# Patient Record
Sex: MALE | Race: WHITE | NOT HISPANIC OR LATINO | Employment: STUDENT | URBAN - METROPOLITAN AREA
[De-identification: names, ages, dates, MRNs, and addresses within clinical notes are randomized per-mention and may not be internally consistent; named-entity substitution may affect disease eponyms.]

---
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
## 2023-03-05 DIAGNOSIS — K58.0 IRRITABLE BOWEL SYNDROME WITH DIARRHEA: ICD-10-CM

## 2023-03-05 DIAGNOSIS — R11.15 NON-INTRACTABLE CYCLICAL VOMITING WITHOUT NAUSEA: ICD-10-CM

## 2023-03-06 RX ORDER — OMEPRAZOLE 20 MG/1
CAPSULE, DELAYED RELEASE ORAL
Qty: 90 CAPSULE | Refills: 3 | Status: SHIPPED | OUTPATIENT
Start: 2023-03-06

## 2023-03-06 RX ORDER — DICYCLOMINE HYDROCHLORIDE 10 MG/1
CAPSULE ORAL
Qty: 90 CAPSULE | Refills: 3 | Status: SHIPPED | OUTPATIENT
Start: 2023-03-06

## 2023-12-19 ENCOUNTER — TELEPHONE (OUTPATIENT)
Dept: GASTROENTEROLOGY | Facility: CLINIC | Age: 18
End: 2023-12-19

## 2024-12-11 ENCOUNTER — TELEPHONE (OUTPATIENT)
Dept: GASTROENTEROLOGY | Facility: CLINIC | Age: 19
End: 2024-12-11

## 2024-12-11 DIAGNOSIS — K58.0 IRRITABLE BOWEL SYNDROME WITH DIARRHEA: ICD-10-CM

## 2024-12-11 DIAGNOSIS — R11.15 NON-INTRACTABLE CYCLICAL VOMITING WITHOUT NAUSEA: ICD-10-CM

## 2024-12-11 RX ORDER — DICYCLOMINE HYDROCHLORIDE 10 MG/1
10 CAPSULE ORAL DAILY
Qty: 90 CAPSULE | Refills: 3 | Status: SHIPPED | OUTPATIENT
Start: 2024-12-11

## 2024-12-11 NOTE — TELEPHONE ENCOUNTER
Discussed with mom at her office visit today, patient is out of meds and symptomatic.    I provided refills, mom will set up an appointment for him at checkout today

## 2025-01-24 ENCOUNTER — TELEPHONE (OUTPATIENT)
Dept: GASTROENTEROLOGY | Facility: CLINIC | Age: 20
End: 2025-01-24

## 2025-01-24 DIAGNOSIS — R11.15 NON-INTRACTABLE CYCLICAL VOMITING WITHOUT NAUSEA: ICD-10-CM

## 2025-01-24 DIAGNOSIS — K58.0 IRRITABLE BOWEL SYNDROME WITH DIARRHEA: ICD-10-CM

## 2025-01-24 RX ORDER — DICYCLOMINE HYDROCHLORIDE 10 MG/1
10 CAPSULE ORAL DAILY
Qty: 90 CAPSULE | Refills: 3 | Status: SHIPPED | OUTPATIENT
Start: 2025-01-24

## 2025-03-19 ENCOUNTER — OFFICE VISIT (OUTPATIENT)
Dept: GASTROENTEROLOGY | Facility: CLINIC | Age: 20
End: 2025-03-19
Payer: COMMERCIAL

## 2025-03-19 VITALS
DIASTOLIC BLOOD PRESSURE: 82 MMHG | BODY MASS INDEX: 34.86 KG/M2 | SYSTOLIC BLOOD PRESSURE: 122 MMHG | HEIGHT: 71 IN | WEIGHT: 249 LBS

## 2025-03-19 DIAGNOSIS — K58.0 IRRITABLE BOWEL SYNDROME WITH DIARRHEA: Primary | ICD-10-CM

## 2025-03-19 DIAGNOSIS — K21.9 GASTROESOPHAGEAL REFLUX DISEASE, UNSPECIFIED WHETHER ESOPHAGITIS PRESENT: ICD-10-CM

## 2025-03-19 PROCEDURE — 99214 OFFICE O/P EST MOD 30 MIN: CPT | Performed by: INTERNAL MEDICINE

## 2025-03-19 NOTE — ASSESSMENT & PLAN NOTE
Stable on omeprazole 20 mg daily.  No alarm symptoms such as dysphagia.  Had vomiting in the past,but this has improved with avoiding overeating.

## 2025-03-19 NOTE — PROGRESS NOTES
Name: Guillaume Rinaldi      : 2005      MRN: [id number]  Encounter Provider: Efe Villa DO  Encounter Date: 3/19/2025   Encounter department: Pending sale to [facility]  :  Assessment & Plan  Irritable bowel syndrome with diarrhea  No IBD or microscopic colitis on colonoscopy in 2019.  Has at least 3 or 4 nonbloody bowel movements per day occasionally urgent, stools typically formed.  No specific food triggers    No need to repeat colonoscopy at this time   I encouraged him to observe for food triggers particularly dairy  Will check labs including celiac panel and TSH, along with stool studies for inflammation and malabsorption    I recommended adding a daily fiber supplement (rarely eats fruits/veggies).   He reduced dicyclomine to twice weekly due to concerns about constipation, I recommended taking it once daily and titrating as needed        Gastroesophageal reflux disease, unspecified whether esophagitis present  Stable on omeprazole 20 mg daily.  No alarm symptoms such as dysphagia.  Had vomiting in the past,but this has improved with avoiding overeating.           History of Present Illness   Guillaume Rinaldi is a 19 y.o. male who presents for reevaluation of reflux and change in bowel habits.  He was last seen in the office 2022.  He is companied by his mother.  He previously had issues with vomiting.  He realized this only occurred when he over ate.  Nausea is more conscientious about eating and back on omeprazole symptoms now occur once a month or less.  He gets occasional heartburn routinely with acidic or spicy foods like tacos.  He denies dysphagia or weight loss.    Lower GI symptoms are generally stable.  He has at least 3 or 4 bowel movements daily spread throughout the day.  They are occasionally urgent.  There is no blood or mucus.  He occasionally strains to initiate the evacuation.  He denies any nocturnal or postprandial diarrhea.  They are typically  "formed, [place] stool scale 4.  He was concerned about the stranding so he reduced the dicyclomine to twice weekly    HPI  History obtained from: patient and patient's parent  Review of Systems A complete review of systems is negative other than that noted above in the HPI.    Medical History Reviewed by provider this encounter:     .  Current Outpatient Medications   Medication Sig Dispense Refill    ARIPiprazole (ABILIFY) 15 mg tablet Take 7.5 mg by mouth 2 (two) times a day      atomoxetine (STRATTERA) 80 MG capsule Take 80 mg by mouth daily      dicyclomine (BENTYL) 10 mg capsule Take 1 capsule (10 mg total) by mouth daily 90 capsule 3    FLUoxetine (PROzac) 20 mg capsule Take 20 mg by mouth every morning      omeprazole (PriLOSEC) 20 mg delayed release capsule Take 1 capsule (20 mg total) by mouth daily 90 capsule 3     No current facility-administered medications for this visit.     Objective   /82   Ht 5' 11\" (1.803 m)   Wt 113 kg (249 lb)   BMI 34.73 kg/m²     Physical Exam  Constitutional:       Appearance: Normal appearance.   HENT:      Head: Normocephalic and atraumatic.      Nose: Nose normal.   Eyes:      Extraocular Movements: Extraocular movements intact.      Conjunctiva/sclera: Conjunctivae normal.   Cardiovascular:      Rate and Rhythm: Normal rate and regular rhythm.   Pulmonary:      Effort: Pulmonary effort is normal.      Breath sounds: Normal breath sounds.   Abdominal:      General: Abdomen is flat. Bowel sounds are normal. There is no distension.      Palpations: Abdomen is soft. There is no mass.      Tenderness: There is no guarding.   Musculoskeletal:         General: Normal range of motion.      Cervical back: Normal range of motion.   Skin:     General: Skin is warm and dry.   Neurological:      General: No focal deficit present.      Mental Status: He is alert.   Psychiatric:         Mood and Affect: Mood normal.         Behavior: Behavior normal.            Lab Results: I " personally reviewed relevant lab results.

## 2025-03-19 NOTE — PATIENT INSTRUCTIONS
I recommend adding a daily fiber supplement like Metamucil or Benefiber.  Please monitor the stool frequency and consistency.  If frequent stools persist I recommend resuming the once daily dosing of dicyclomine to help with the stool frequency     We will check blood work and stool studies to assess for inflammation in the small and large intestine.    Will continue the omeprazole 20 mg daily for reflux and gastritis.  If symptoms flare please let me know and we can consider briefly increasing to 40 mg

## 2025-03-19 NOTE — ASSESSMENT & PLAN NOTE
No IBD or microscopic colitis on colonoscopy in 2019.  Has at least 3 or 4 nonbloody bowel movements per day occasionally urgent, stools typically formed.  No specific food triggers    No need to repeat colonoscopy at this time   I encouraged him to observe for food triggers particularly dairy  Will check labs including celiac panel and TSH, along with stool studies for inflammation and malabsorption    I recommended adding a daily fiber supplement (rarely eats fruits/veggies).   He reduced dicyclomine to twice weekly due to concerns about constipation, I recommended taking it once daily and titrating as needed         Patient Education     ?Rest - Rest the affected area. To rest the ankle, you can use crutches and stay off your feet.    ?Ice - Apply a cold gel pack, bag of ice, or bag of frozen vegetables on your sprained joint every 1 to 2 hours, for 15 minutes each time. Put a thin towel between the ice (or other cold object) and your skin. Use the ice (or other cold object) for at least 2 days after your injury. I reccomend icing the affected area for 2 to 3 days after their injury.    ?Compression - Compression basically means pressure. You want to have your ankle/wrist/knee under slight pressure by having it wrapped in an elastic “compression” bandage. This helps reduce swelling.  It’s important that you do not use too much pressure and cut off the blood flow to your foot.    ?Elevation - “Elevation” means you should keep your affected joint raised up above the level of your heart. To do this, you can put your foot on some pillows or blankets while you are lying down, or on a table or chair while you are sitting.    You can also take medicines to relieve pain, such as acetaminophen (brand name: Tylenol), ibuprofen ( brand names: Advil, Motrin), or naproxen (brand name: Aleve).    I recommend ibuprofen, 400-600mg scheduled, with food, every 8 hours until pain and swelling are relived.       Wrist Sprain  A sprain is an injury to the ligaments or capsule that holds a joint together. There are no broken bones. Most sprains take about 3 to 6 weeks to heal. If it a severe sprain where the ligament is completely torn, it can take months to recover.     Most wrist sprains are treated with a splint, wrist brace, or elastic wrap for support. Severe sprains may require surgery.  Home care  · Keep your arm elevated to reduce pain and swelling. This is very important during the first 48 hours.  · Apply an ice pack over the injured area for 15 to 20 minutes every 3 to 6 hours. You should do this for the first 24 to 48 hours. You can make  an ice pack by filling a plastic bag that seals at the top with ice cubes and then wrapping it with a thin towel. Continue to use ice packs for relief of pain and swelling as needed. As the ice melts, be careful to avoid getting your wrap, splint, or cast wet. After 48 hours, apply heat (warm shower or warm bath) for 15 to 20 minutes several times a day, or alternate ice and heat.   · You may use over-the-counter pain medicine to control pain, unless another pain medicine was prescribed. If you have chronic liver or kidney disease or ever had a stomach ulcer or GI bleeding, talk with your doctor before using these medicines.  · If you were given a splint or brace, wear it for the time advised by your doctor.  Follow-up care  Follow up with your healthcare provider as advised. Any X-rays you had today don’t show any broken bones, breaks, or fractures. Sometimes fractures don’t show up on the first X-ray. Bruises and sprains can sometimes hurt as much as a fracture. These injuries can take time to heal completely. If your symptoms don’t improve or they get worse, talk with your doctor. You may need a repeat X-ray. If X-rays were taken, you will be told of any new findings that may affect your care.  When to seek medical advice  Call your healthcare provider right away if any of these occur:  · Pain or swelling increases  · Fingers or hand becomes cold, blue, numb, or tingly  Date Last Reviewed: 11/20/2015  © 5580-5010 [employer]. [address] [place] [address], [place] [address]. All rights reserved. This information is not intended as a substitute for professional medical care. Always follow your healthcare professional's instructions.